# Patient Record
(demographics unavailable — no encounter records)

---

## 2025-02-25 NOTE — HISTORY OF PRESENT ILLNESS
[Postpartum Follow Up] : postpartum follow up [Complications:___] : no complications [Last Pap Date: ___] : Last Pap Date: [unfilled] [Delivery Date: ___] : on [unfilled] [] : delivered by vaginal delivery [Male] : Delivery History: baby boy [Wt. ___] : weighing [unfilled] [Breastfeeding] : currently nursing [None] : No associated symptoms are reported [Back to Normal] : is still enlarged [Mild] : mild vaginal bleeding [Cervix Sample Taken] : cervical sample not taken for a Pap smear [Doing Well] : is doing well [de-identified] : Concerns about clogged ducts - no evidence of folliculitis [de-identified] : 22 y.o. S/P  on  via IOL @ 41+.  At intake,  pt was 3.5cm.  Pt reports she was given the epidural and went to sleep.  Awaoke w/ feling to push.  At 10 cm, preparing the room, baby delivered in bed.  Pt presents today, 2 weeks since  w/ c/o vaginal odor and report of cramping and pain.  Pt is not taking any OTC pain meds.  1) R/O infection.  [] NAAT collected 2) Pain management.  Encouraged pt to take Motrin and Tylenol, use sitz bath.  All prescribed  3) Clogged duct?  No evidence of mastititis.  Reviewed sxs of this.  Encouraged hot compresses, massage area.  [] Tasked SW to f/up to connect pt w/ lactation 4) PP depression.  Denies sxs 5) Contraception. Undecided [de-identified] : [] f/up in 1 month for full PP SHASHI Menchaca, PAC

## 2025-04-18 NOTE — HISTORY OF PRESENT ILLNESS
[Good] : being in good health [Reproductive Age] : is of reproductive age [Menstrual Problems] : reports abnormal menses [Abnormal Duration ___ days] : the duration was abnormal lasting [unfilled] days [Sexually Active] : is sexually active [Acute] : an acute [Vagina] : vagina [Burning] : no burning [Itching] : no itching [Lesion] : no lesion [Soreness] : no soreness [Discharge] : no discharge [Vaginal Odor Foul] : vaginal odor not foul [Skin Color Change] : no skin color change [Genital Wart] : no genital wart [Skin Growth] : no skin growth(s) [Skin Ulcerations] : no skin ulceration(s) [FreeTextEntry9] : feeling of internal paper cuts

## 2025-04-18 NOTE — PHYSICAL EXAM
[Normal] : uterus [No Bleeding] : there was no active vaginal bleeding [Uterine Adnexae] : were not tender and not enlarged [Labia Majora] : labia major [Labia Minora] : labia minora

## 2025-04-18 NOTE — REVIEW OF SYSTEMS
[Vaginal Pain] : vaginal pain [Nl] : Integumentary [Burning Sensation] : no burning sensation during urination

## 2025-04-23 NOTE — ADDENDUM
[FreeTextEntry1] :  I, Siobhan Stewart, acted solely as a scribe for Dr. Franc Richardson MD on this date 04/23/2025    All medical record entries made by the Scribe were at my, Dr. Franc Richardson MD., direction and personally dictated by me on 04/23/2025 . I have reviewed the chart and agree that the record accurately reflects my personal performance of the history, physical exam, assessment and plan. I have also personally directed, reviewed, and agreed with the chart.

## 2025-04-23 NOTE — ASSESSMENT
[FreeTextEntry1] : I had a lengthy discussion with the patient in regards to their treatment plan and diagnosis. Their symptoms have persisted despite the conservative management they have attempted thus far.  As a result I would like to proceed with a lumbar MRI.  In tandem with this they should begin a physical therapy/home therapy program.  The patient can take Tylenol/NSAIDs as needed for pain control if medically able to.  I will have the patient follow-up in 3 to 4 weeks for repeat clinical evaluation.  I encouraged them to follow-up sooner if their symptoms worsen or change in any way.

## 2025-04-23 NOTE — PHYSICAL EXAM
[facility] Call Center    Phone Message    May a detailed message be left on voicemail: yes     Reason for Call: Medication Question or concern regarding medication   Prescription Clarification  Name of Medication: evolocumab (REPATHA) 140 MG/ML prefilled autoinjector   Prescribing Provider: Trinity Herrera MD   Pharmacy: [facility] [address] [place] [address]   What on the order needs clarification? Pt states last 2 injections of medication did not work. Pt called manufacture to try to get advice and has been watching videos and still had trouble with second dose of medication. Pt would like a call back to discuss going forward as she is unclear how to proceed. Pt states she was unable to see Angella Rivera at yesterdays appointment that was running behind and needed to leave and would also like a recommendation on when she should see Miguel for a follow up again. Please return call to discuss.           Action Taken: Other: Cardiology    Travel Screening: Not Applicable     Thank you!  [facility]                                                                       [de-identified] :  Lumbar Physical Exam   Gait - Normal   Station - Normal   Sagittal balance - Normal   Compensatory mechanism? - None   Heel walk - Normal   Toe walk - Normal   Reflexes Patellar - normal Gastroc - normal Clonus - No   Hip Exam - Normal   Straight leg raise - none   Pulses - 2+ dp/pt   Range of motion - normal   Sensation Sensation intact to light touch in L1, L2, L3, L4, L5 and S1 dermatomes bilaterally   Motor IP Quad HS TA Gastroc EHL Right 5/5 5/5 5/5 5/5 5/5 5/5 Left 5/5 5/5 5/5 5/5 5/5 5/5 [de-identified] : lumbar rads no significant facet arthropathy disc heights well maintained

## 2025-04-23 NOTE — HISTORY OF PRESENT ILLNESS
[de-identified] : Patient is a 22 year old female who presents today for an initial evaluation of throbbing low back pain and shoulder pain. She reports that her pain has worsened since she gave birth two months ago. Denies radicular sxs.